# Patient Record
Sex: MALE | Race: WHITE | NOT HISPANIC OR LATINO | Employment: FULL TIME | URBAN - METROPOLITAN AREA
[De-identification: names, ages, dates, MRNs, and addresses within clinical notes are randomized per-mention and may not be internally consistent; named-entity substitution may affect disease eponyms.]

---

## 2019-03-06 ENCOUNTER — OFFICE VISIT (OUTPATIENT)
Dept: URGENT CARE | Facility: CLINIC | Age: 29
End: 2019-03-06
Payer: COMMERCIAL

## 2019-03-06 VITALS
BODY MASS INDEX: 23.03 KG/M2 | WEIGHT: 170 LBS | RESPIRATION RATE: 18 BRPM | DIASTOLIC BLOOD PRESSURE: 81 MMHG | TEMPERATURE: 100 F | HEIGHT: 72 IN | SYSTOLIC BLOOD PRESSURE: 135 MMHG | HEART RATE: 86 BPM | OXYGEN SATURATION: 100 %

## 2019-03-06 DIAGNOSIS — B30.9 ACUTE VIRAL CONJUNCTIVITIS OF RIGHT EYE: ICD-10-CM

## 2019-03-06 DIAGNOSIS — J02.0 STREP PHARYNGITIS: Primary | ICD-10-CM

## 2019-03-06 LAB — S PYO AG THROAT QL: POSITIVE

## 2019-03-06 PROCEDURE — 87430 STREP A AG IA: CPT | Performed by: EMERGENCY MEDICINE

## 2019-03-06 PROCEDURE — 99203 OFFICE O/P NEW LOW 30 MIN: CPT | Performed by: EMERGENCY MEDICINE

## 2019-03-06 RX ORDER — BUPRENORPHINE HYDROCHLORIDE, NALOXONE HYDROCHLORIDE 8; 2 MG/1; MG/1
FILM, SOLUBLE BUCCAL; SUBLINGUAL
Refills: 0 | COMMUNITY
Start: 2019-02-14

## 2019-03-06 RX ORDER — POLYMYXIN B SULFATE AND TRIMETHOPRIM 1; 10000 MG/ML; [USP'U]/ML
1 SOLUTION OPHTHALMIC EVERY 6 HOURS
Qty: 10 ML | Refills: 0 | Status: SHIPPED | OUTPATIENT
Start: 2019-03-06 | End: 2019-03-13

## 2019-03-06 RX ORDER — CLINDAMYCIN HYDROCHLORIDE 300 MG/1
300 CAPSULE ORAL 3 TIMES DAILY
Qty: 21 CAPSULE | Refills: 0 | Status: SHIPPED | OUTPATIENT
Start: 2019-03-06 | End: 2019-03-13

## 2019-03-06 RX ORDER — PREDNISONE 10 MG/1
TABLET ORAL
Qty: 27 TABLET | Refills: 0 | Status: SHIPPED | OUTPATIENT
Start: 2019-03-06

## 2019-03-06 NOTE — LETTER
March 6, 2019     Patient: Rosaura Barfield   YOB: 1990   Date of Visit: 3/6/2019       To Whom It May Concern: It is my medical opinion that Rosaura Barfield may return to work on 3/8/19  Please excuse 3/6 and 3/7  If you have any questions or concerns, please don't hesitate to call           Sincerely,        Evaristo Rosado MD    CC: No Recipients

## 2019-03-06 NOTE — PROGRESS NOTES
[facility]        NAME: Saji Garcia is a 26 y o  male  : 1990    MRN: [id number]  DATE: 2019  TIME: 10:29 AM    Assessment and Plan   Strep pharyngitis [J02 0]  1  Strep pharyngitis  POCT rapid strepA    clindamycin (CLEOCIN) 300 MG capsule    predniSONE 10 mg tablet   2  Acute viral conjunctivitis of right eye  polymyxin b-trimethoprim (POLYTRIM) ophthalmic solution         Patient Instructions     Patient Instructions     You have been diagnosed with conjunctivitis, which may be viral, bacterial, allergic or chemical and there is no test available in an urgent care setting can be used to tell the difference   Statistically 95% of the cases are viral   The viruses that cause conjunctivitis often times for the same viruses that cause viral upper respiratory infections  Although your conjunctivitis today is likely viral we do give antibiotic drops as a precaution   Avoid Visine or any similar vasoconstrictor drops as this will interfere with your body's ability to fight this infection   Use only the antibiotic drops as prescribed but you may also  use ice or cool compresses to t the he eyelids or an eye cup with cold saline to reduce redness or swelling  You may also take an anti-inflammatory like Advil leave for the redness and swelling  Take Zinc 12 5 to 15 mg every 2 - 3 hrs while awake for the next few days   You may take Cold Jose De Jesus (13 3 mg of Zinc) or split a 25 mg Zinc tablet or lozenge in two or a 50 mg into four to get the proper dose   The total daily dose of Zinc should exceed 75 mg per day  Conjunctivitis   AMBULATORY CARE:   Conjunctivitis,  or pink eye, is inflammation of your conjunctiva  The conjunctiva is a thin tissue that covers the front of your eye and the back of your eyelids  The conjunctiva helps protect your eye and keep it moist  Conjunctivitis may be caused by bacteria, allergies, or a virus   If your conjunctivitis is caused by bacteria, it may get better on its own in about 7 days  Viral conjunctivitis can last up to 3 weeks  Common symptoms may include any of the following: You will usually have symptoms in both eyes if your conjunctivitis is caused by allergies  You may also have other allergic symptoms, such as a rash or runny nose  Symptoms will usually start in 1 eye if your conjunctivitis is caused by a virus or bacteria  · Redness in the whites of your eye    · Itching in your eye or around your eye    · Feeling like there is something in your eye    · Watery or thick, sticky discharge    · Crusty eyelids when you wake up in the morning    · Burning, stinging, or swelling in your eye    · Pain when you see bright light  Seek care immediately if:   · You have worsening eye pain  · The swelling in your eye gets worse, even after treatment  · Your vision suddenly becomes worse or you cannot see at all  Contact your healthcare provider if:   · You develop a fever and ear pain  · You have tiny bumps or spots of blood on your eye  · You have questions or concerns about your condition or care  Treatment  will depend on the cause of your conjunctivitis  You may need antibiotics or allergy medicine as a pill, eye drop, or eye ointment  Manage your symptoms:   · Apply a cool compress  Wet a washcloth with cold water and place it on your eye  This will help decrease itching and irritation  · Do not wear contact lenses  They can irritate your eye  Throw away the pair you are using and ask when you can wear them again  Use a new pair of lenses when your healthcare provider says it is okay  · Avoid irritants  Stay away from smoke filled areas  Shield your eyes from wind and sun  · Flush your eye  You may need to flush your eye with saline to help decrease your symptoms  Ask for more information on how to flush your eye  Medicines:  Treatment depends on what is causing your conjunctivitis   You may be given any of the following:  · Allergy medicine  helps decrease itchy, red, swollen eyes caused by allergies  It may be given as a pill, eye drops, or nasal spray  · Antibiotics  may be needed if your conjunctivitis is caused by bacteria  This medicine may be given as a pill, eye drops, or eye ointment  · Take your medicine as directed  Contact your healthcare provider if you think your medicine is not helping or if you have side effects  Tell him or her if you are allergic to any medicine  Keep a list of the medicines, vitamins, and herbs you take  Include the amounts, and when and why you take them  Bring the list or the pill bottles to follow-up visits  Carry your medicine list with you in case of an emergency  Prevent the spread of conjunctivitis:   · Wash your hands with soap and water often  Wash your hands before and after you touch your eyes  Also wash your hands before you prepare or eat food and after you use the bathroom or change a diaper  · Avoid allergens  Try to avoid the things that cause your allergies, such as pets, dust, or grass  · Avoid contact with others  Do not share towels or washcloths  Try to stay away from others as much as possible  Ask when you can return to work or school  · Throw away eye makeup  The bacteria that caused your conjunctivitis can stay in eye makeup  Throw away mascara and other eye makeup  © 2017 [address] Information is for End User's use only and may not be sold, redistributed or otherwise used for commercial purposes  All illustrations and images included in CareNotes® are the copyrighted property of [employer]  or [employer]  The above information is an  only  It is not intended as medical advice for individual conditions or treatments  Talk to your doctor, nurse or pharmacist before following any medical regimen to see if it is safe and effective for you      Strep Throat, Ambulatory Care   GENERAL INFORMATION:   Strep throat  is a throat infection caused by bacteria  It is easily spread from person to person  Common symptoms include the following:   · Sore, red, and swollen throat    · Fever and headache     · Upset stomach, abdominal pain, or vomiting    · White or yellow patches or blisters in the back of your throat    · Tender, swollen lumps on the sides of your neck or jaw    · Throat pain when you swallow  Seek immediate care for the following symptoms:   · Throat pain that makes it too painful to drink fluids    · Drooling because you cannot swallow your spit    · Not able to open your mouth all the way or your voice is muffled    · Trouble breathing because your throat is swollen    · New symptoms like a bad headache, stiff neck, chest pain, or vomiting    · Blood in your urine and swollen face, feet, or hands  Treatment for strep throat:  You will need antibiotic medicine to treat your strep throat  Take your antibiotics until they are gone, even if you feel better  Do this unless your caregiver says it is okay to stop your antibiotics  You may return to work or school 24 hours after you start antibiotics  Manage strep throat:   · Do not smoke  If you smoke, it is never too late to quit  Smoking may make your symptoms worse  Ask for information if you need help quitting  · Drink juice, milk shakes, or soup  if your throat is too sore to eat solid food  Drinking liquids can also help prevent dehydration  · Gargle with salt water  Mix ¼ teaspoon salt in a glass of warm water and gargle  This may help reduce swelling in your throat  · Use lozenges, ice, soft foods, or popsicles  to soothe your throat    Prevent the spread of strep throat:   · Do not share food or drinks    · Wash your hands often    · Replace your toothbrush after you have taken antibiotics for 24 hours  Follow up with your healthcare provider as directed:  Write down your questions so you remember to ask them during your visits  CARE AGREEMENT:   You have the right to help plan your care  Learn about your health condition and how it may be treated  Discuss treatment options with your caregivers to decide what care you want to receive  You always have the right to refuse treatment  The above information is an  only  It is not intended as medical advice for individual conditions or treatments  Talk to your doctor, nurse or pharmacist before following any medical regimen to see if it is safe and effective for you  © 2014 [address] is for End User's use only and may not be sold, redistributed or otherwise used for commercial purposes  All illustrations and images included in CareNotes® are the copyrighted property of [employer]  or [employer]  Follow up with PCP in 3-5 days  Proceed to  ER if symptoms worsen  Chief Complaint     Chief Complaint   Patient presents with    Eye Pain     1 week with fever and right eye redness started last night with Rash          History of Present Illness       Patient complains of sore throat, fever, congestion, for the past several days now with pink right eye with discharge and a mildly pruritic truncal rash since last night  He has a history of 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  His most recent episodes of strep pharyngitis have been treated successfully with clindamycin 300 mg 3 times a day  Review of Systems   Review of Systems   Constitutional: Positive for fever  Negative for chills  HENT: Positive for congestion, rhinorrhea, sinus pressure and sore throat  Negative for ear discharge, ear pain and hearing loss  Eyes: Positive for discharge and redness  Negative for pain and visual disturbance  Respiratory: Negative for cough, shortness of breath and wheezing  Gastrointestinal: Negative for diarrhea and vomiting  Musculoskeletal: Negative for neck stiffness  Skin: Negative for pallor and rash  Neurological: Negative for headaches  Current Medications       Current Outpatient Medications:     clindamycin (CLEOCIN) 300 MG capsule, Take 1 capsule (300 mg total) by mouth 3 (three) times a day for 7 days, Disp: 21 capsule, Rfl: 0    polymyxin b-trimethoprim (POLYTRIM) ophthalmic solution, Administer 1 drop to the right eye every 6 (six) hours for 7 days, Disp: 10 mL, Rfl: 0    predniSONE 10 mg tablet, Take once daily all days pills on this schedule 6- 6- 5- 4- 3- 2- 1, Disp: 27 tablet, Rfl: 0    SUBOXONE 8-2 MG, DISSOLVE 2 FILMS IN YOUR MOUTH DAILY, Disp: , Rfl: 0    Current Allergies     Allergies as of 03/06/2019 - Reviewed 03/06/2019   Allergen Reaction Noted    Penicillins Anaphylaxis 03/06/2019            The following portions of the patient's history were reviewed and updated as appropriate: allergies, current medications, past family history, past medical history, past social history, past surgical history and problem list      Past Medical History:   Diagnosis Date    No known problems        Past Surgical History:   Procedure Laterality Date    HERNIA REPAIR         Family History   Problem Relation Age of Onset    Heart attack Mother     Stroke Mother     Diabetes Father          Medications have been verified  Objective   /81   Pulse 86   Temp 100 °F (37 8 °C)   Resp 18   Ht 6' (1 829 m)   Wt 77 1 kg (170 lb)   SpO2 100%   BMI 23 06 kg/m²        Physical Exam     Physical Exam   Constitutional: He is oriented to person, place, and time  He appears well-developed and well-nourished  No distress  HENT:   Mouth/Throat: Oropharyngeal exudate and posterior oropharyngeal erythema present  No tonsillar abscesses  Eyes: Pupils are equal, round, and reactive to light  EOM are normal    Conjunctiva injected on the right with watery discharge  Neck: Neck supple  Cardiovascular: Normal rate and regular rhythm     Pulmonary/Chest: Effort normal and breath sounds normal    Musculoskeletal: He exhibits no tenderness  Neurological: He is alert and oriented to person, place, and time  Skin: Skin is warm and dry  Rash noted  Erythematous, sandpaper type rash of his torso and proximal extremities  Nursing note and vitals reviewed

## 2019-03-06 NOTE — PATIENT INSTRUCTIONS
You have been diagnosed with conjunctivitis, which may be viral, bacterial, allergic or chemical and there is no test available in an urgent care setting can be used to tell the difference   Statistically 95% of the cases are viral   The viruses that cause conjunctivitis often times for the same viruses that cause viral upper respiratory infections  Although your conjunctivitis today is likely viral we do give antibiotic drops as a precaution   Avoid Visine or any similar vasoconstrictor drops as this will interfere with your body's ability to fight this infection   Use only the antibiotic drops as prescribed but you may also  use ice or cool compresses to t the he eyelids or an eye cup with cold saline to reduce redness or swelling  You may also take an anti-inflammatory like Advil leave for the redness and swelling  Take Zinc 12 5 to 15 mg every 2 - 3 hrs while awake for the next few days   You may take Cold Jose De Jesus (13 3 mg of Zinc) or split a 25 mg Zinc tablet or lozenge in two or a 50 mg into four to get the proper dose   The total daily dose of Zinc should exceed 75 mg per day  Conjunctivitis   AMBULATORY CARE:   Conjunctivitis,  or pink eye, is inflammation of your conjunctiva  The conjunctiva is a thin tissue that covers the front of your eye and the back of your eyelids  The conjunctiva helps protect your eye and keep it moist  Conjunctivitis may be caused by bacteria, allergies, or a virus  If your conjunctivitis is caused by bacteria, it may get better on its own in about 7 days  Viral conjunctivitis can last up to 3 weeks  Common symptoms may include any of the following: You will usually have symptoms in both eyes if your conjunctivitis is caused by allergies  You may also have other allergic symptoms, such as a rash or runny nose  Symptoms will usually start in 1 eye if your conjunctivitis is caused by a virus or bacteria    · Redness in the whites of your eye    · Itching in your eye or around your eye    · Feeling like there is something in your eye    · Watery or thick, sticky discharge    · Crusty eyelids when you wake up in the morning    · Burning, stinging, or swelling in your eye    · Pain when you see bright light  Seek care immediately if:   · You have worsening eye pain  · The swelling in your eye gets worse, even after treatment  · Your vision suddenly becomes worse or you cannot see at all  Contact your healthcare provider if:   · You develop a fever and ear pain  · You have tiny bumps or spots of blood on your eye  · You have questions or concerns about your condition or care  Treatment  will depend on the cause of your conjunctivitis  You may need antibiotics or allergy medicine as a pill, eye drop, or eye ointment  Manage your symptoms:   · Apply a cool compress  Wet a washcloth with cold water and place it on your eye  This will help decrease itching and irritation  · Do not wear contact lenses  They can irritate your eye  Throw away the pair you are using and ask when you can wear them again  Use a new pair of lenses when your healthcare provider says it is okay  · Avoid irritants  Stay away from smoke filled areas  Shield your eyes from wind and sun  · Flush your eye  You may need to flush your eye with saline to help decrease your symptoms  Ask for more information on how to flush your eye  Medicines:  Treatment depends on what is causing your conjunctivitis  You may be given any of the following:  · Allergy medicine  helps decrease itchy, red, swollen eyes caused by allergies  It may be given as a pill, eye drops, or nasal spray  · Antibiotics  may be needed if your conjunctivitis is caused by bacteria  This medicine may be given as a pill, eye drops, or eye ointment  · Take your medicine as directed  Contact your healthcare provider if you think your medicine is not helping or if you have side effects   Tell him or her if you are allergic to any medicine  Keep a list of the medicines, vitamins, and herbs you take  Include the amounts, and when and why you take them  Bring the list or the pill bottles to follow-up visits  Carry your medicine list with you in case of an emergency  Prevent the spread of conjunctivitis:   · Wash your hands with soap and water often  Wash your hands before and after you touch your eyes  Also wash your hands before you prepare or eat food and after you use the bathroom or change a diaper  · Avoid allergens  Try to avoid the things that cause your allergies, such as pets, dust, or grass  · Avoid contact with others  Do not share towels or washcloths  Try to stay away from others as much as possible  Ask when you can return to work or school  · Throw away eye makeup  The bacteria that caused your conjunctivitis can stay in eye makeup  Throw away mascara and other eye makeup  © 2017 [address] Information is for End User's use only and may not be sold, redistributed or otherwise used for commercial purposes  All illustrations and images included in CareNotes® are the copyrighted property of [employer]  or [employer]  The above information is an  only  It is not intended as medical advice for individual conditions or treatments  Talk to your doctor, nurse or pharmacist before following any medical regimen to see if it is safe and effective for you  Strep Throat, Ambulatory Care   GENERAL INFORMATION:   Strep throat  is a throat infection caused by bacteria  It is easily spread from person to person     Common symptoms include the following:   · Sore, red, and swollen throat    · Fever and headache     · Upset stomach, abdominal pain, or vomiting    · White or yellow patches or blisters in the back of your throat    · Tender, swollen lumps on the sides of your neck or jaw    · Throat pain when you swallow  Seek immediate care for the following symptoms: · Throat pain that makes it too painful to drink fluids    · Drooling because you cannot swallow your spit    · Not able to open your mouth all the way or your voice is muffled    · Trouble breathing because your throat is swollen    · New symptoms like a bad headache, stiff neck, chest pain, or vomiting    · Blood in your urine and swollen face, feet, or hands  Treatment for strep throat:  You will need antibiotic medicine to treat your strep throat  Take your antibiotics until they are gone, even if you feel better  Do this unless your caregiver says it is okay to stop your antibiotics  You may return to work or school 24 hours after you start antibiotics  Manage strep throat:   · Do not smoke  If you smoke, it is never too late to quit  Smoking may make your symptoms worse  Ask for information if you need help quitting  · Drink juice, milk shakes, or soup  if your throat is too sore to eat solid food  Drinking liquids can also help prevent dehydration  · Gargle with salt water  Mix ¼ teaspoon salt in a glass of warm water and gargle  This may help reduce swelling in your throat  · Use lozenges, ice, soft foods, or popsicles  to soothe your throat  Prevent the spread of strep throat:   · Do not share food or drinks    · Wash your hands often    · Replace your toothbrush after you have taken antibiotics for 24 hours  Follow up with your healthcare provider as directed:  Write down your questions so you remember to ask them during your visits  CARE AGREEMENT:   You have the right to help plan your care  Learn about your health condition and how it may be treated  Discuss treatment options with your caregivers to decide what care you want to receive  You always have the right to refuse treatment  The above information is an  only  It is not intended as medical advice for individual conditions or treatments   Talk to your doctor, nurse or pharmacist before following any medical regimen to see if it is safe and effective for you  © 2014 [address] is for End User's use only and may not be sold, redistributed or otherwise used for commercial purposes  All illustrations and images included in CareNotes® are the copyrighted property of [employer]  or [employer]

## 2023-10-15 PROBLEM — Z30.2 ENCOUNTER FOR VASECTOMY: Status: ACTIVE | Noted: 2023-10-15

## 2023-10-16 ENCOUNTER — OFFICE VISIT (OUTPATIENT)
Dept: UROLOGY | Facility: CLINIC | Age: 33
End: 2023-10-16
Payer: COMMERCIAL

## 2023-10-16 ENCOUNTER — APPOINTMENT (OUTPATIENT)
Dept: LAB | Facility: HOSPITAL | Age: 33
End: 2023-10-16
Payer: COMMERCIAL

## 2023-10-16 ENCOUNTER — PREP FOR PROCEDURE (OUTPATIENT)
Dept: UROLOGY | Facility: CLINIC | Age: 33
End: 2023-10-16

## 2023-10-16 VITALS
HEIGHT: 72 IN | TEMPERATURE: 98.6 F | HEART RATE: 67 BPM | BODY MASS INDEX: 24.57 KG/M2 | WEIGHT: 181.4 LBS | SYSTOLIC BLOOD PRESSURE: 122 MMHG | OXYGEN SATURATION: 98 % | DIASTOLIC BLOOD PRESSURE: 72 MMHG

## 2023-10-16 DIAGNOSIS — Z30.09 ENCOUNTER FOR OTHER GENERAL COUNSELING AND ADVICE ON CONTRACEPTION: ICD-10-CM

## 2023-10-16 DIAGNOSIS — Z01.818 ENCOUNTER FOR PREADMISSION TESTING: ICD-10-CM

## 2023-10-16 DIAGNOSIS — Z01.818 ENCOUNTER FOR PREADMISSION TESTING: Primary | ICD-10-CM

## 2023-10-16 DIAGNOSIS — Z30.2 ENCOUNTER FOR VASECTOMY: Primary | ICD-10-CM

## 2023-10-16 LAB
ANION GAP SERPL CALCULATED.3IONS-SCNC: 5 MMOL/L
BASOPHILS # BLD AUTO: 0.03 THOUSANDS/ÂΜL (ref 0–0.1)
BASOPHILS NFR BLD AUTO: 0 % (ref 0–1)
BUN SERPL-MCNC: 12 MG/DL (ref 5–25)
CALCIUM SERPL-MCNC: 9.6 MG/DL (ref 8.4–10.2)
CHLORIDE SERPL-SCNC: 103 MMOL/L (ref 96–108)
CO2 SERPL-SCNC: 30 MMOL/L (ref 21–32)
CREAT SERPL-MCNC: 0.84 MG/DL (ref 0.6–1.3)
EOSINOPHIL # BLD AUTO: 0.27 THOUSAND/ÂΜL (ref 0–0.61)
EOSINOPHIL NFR BLD AUTO: 4 % (ref 0–6)
ERYTHROCYTE [DISTWIDTH] IN BLOOD BY AUTOMATED COUNT: 11.9 % (ref 11.6–15.1)
GFR SERPL CREATININE-BSD FRML MDRD: 114 ML/MIN/1.73SQ M
GLUCOSE P FAST SERPL-MCNC: 95 MG/DL (ref 65–99)
HCT VFR BLD AUTO: 48.5 % (ref 36.5–49.3)
HGB BLD-MCNC: 16.3 G/DL (ref 12–17)
IMM GRANULOCYTES # BLD AUTO: 0.01 THOUSAND/UL (ref 0–0.2)
IMM GRANULOCYTES NFR BLD AUTO: 0 % (ref 0–2)
LYMPHOCYTES # BLD AUTO: 2.42 THOUSANDS/ÂΜL (ref 0.6–4.47)
LYMPHOCYTES NFR BLD AUTO: 32 % (ref 14–44)
MCH RBC QN AUTO: 29.1 PG (ref 26.8–34.3)
MCHC RBC AUTO-ENTMCNC: 33.6 G/DL (ref 31.4–37.4)
MCV RBC AUTO: 87 FL (ref 82–98)
MONOCYTES # BLD AUTO: 0.45 THOUSAND/ÂΜL (ref 0.17–1.22)
MONOCYTES NFR BLD AUTO: 6 % (ref 4–12)
NEUTROPHILS # BLD AUTO: 4.32 THOUSANDS/ÂΜL (ref 1.85–7.62)
NEUTS SEG NFR BLD AUTO: 58 % (ref 43–75)
NRBC BLD AUTO-RTO: 0 /100 WBCS
PLATELET # BLD AUTO: 197 THOUSANDS/UL (ref 149–390)
PMV BLD AUTO: 9.3 FL (ref 8.9–12.7)
POTASSIUM SERPL-SCNC: 4.4 MMOL/L (ref 3.5–5.3)
RBC # BLD AUTO: 5.6 MILLION/UL (ref 3.88–5.62)
SODIUM SERPL-SCNC: 138 MMOL/L (ref 135–147)
WBC # BLD AUTO: 7.5 THOUSAND/UL (ref 4.31–10.16)

## 2023-10-16 PROCEDURE — 36415 COLL VENOUS BLD VENIPUNCTURE: CPT

## 2023-10-16 PROCEDURE — 80048 BASIC METABOLIC PNL TOTAL CA: CPT

## 2023-10-16 PROCEDURE — 99203 OFFICE O/P NEW LOW 30 MIN: CPT | Performed by: UROLOGY

## 2023-10-16 PROCEDURE — 85025 COMPLETE CBC W/AUTO DIFF WBC: CPT

## 2023-10-16 RX ORDER — LEVOFLOXACIN 5 MG/ML
500 INJECTION, SOLUTION INTRAVENOUS ONCE
OUTPATIENT
Start: 2023-10-16 | End: 2023-10-16

## 2023-10-16 NOTE — PROGRESS NOTES
UROLOGY PROGRESS NOTE         NAME: Livia Thomson  AGE: 28 y.o. SEX: male  : 1990   MRN: [id number]    DATE: 10/16/2023  TIME: 10:15 AM    Assessment and Plan      Impression:   1. Encounter for vasectomy  -     Case request operating room: VASECTOMY; Standing  -     Case request operating room: VASECTOMY    2. Encounter for other general counseling and advice on contraception  -     Ambulatory Referral to Urology         Plan: Patient wants to proceed with vasectomy which will be done under an appropriate anesthetic at the hospital.  This is primarily due to the fact that he was sensitive and vasovagal with the exam as well as scarring on the left side related to his previous hernia repair. The vasectomy procedure is risks limitations outcomes and alternatives were reviewed in detail and he wishes to proceed. Pertinent risks including bleeding infection recanalization, failure to sterilize the patient, need for additional procedures sperm granuloma, chronic pain amongst others were reviewed and understood. Chief Complaint     Chief Complaint   Patient presents with   • Vasectomy     History of Present Illness     HPI: Livia Thomson is a 28y.o. year old male who presents with desire for elective sterilization by vasectomy. Patient is  his wife is 28 they have 2 children who are teenagers. He is otherwise in good health. The following portions of the patient's history were reviewed and updated as appropriate: allergies, current medications, past family history, past medical history, past social history, past surgical history and problem list.  Past Medical History:   Diagnosis Date   • No known problems      Past Surgical History:   Procedure Laterality Date   • HERNIA REPAIR       shoulder  Review of Systems     Const: Denies chills, fever and weight loss. CV: Denies chest pain. Resp: Denies SOB. GI: Denies abdominal pain, nausea and vomiting.   : Denies symptoms other than stated above. Musculo: Denies back pain. Objective   /72 (BP Location: Left arm, Patient Position: Sitting, Cuff Size: Adult)   Pulse 67   Temp 98.6 °F (37 °C)   Ht 6' (1.829 m)   Wt 82.3 kg (181 lb 6.4 oz)   SpO2 98%   BMI 24.60 kg/m²     Physical Exam  Const: Appears healthy and well developed. No signs of acute distress present. Resp: Respirations are regular and unlabored. CV: Rate is regular. Rhythm is regular. Abdomen: Abdomen is soft, nontender, and nondistended. Kidneys are not palpable. : Patient had a hernia repair on the left side many years ago. Apparently this was an incarcerated hernia. The left vas deferens is present but somewhat difficult to access. There are some scarring likely related to his old hernia repair. The right vas is accessible and normal.  He is also very sensitive with the exam and became vasovagal.  The testes do not show any evidence of mass each epididymis is normal.  Psych: Patient's attitude is cooperative.  Mood is normal. Affect is normal.    Procedure   Procedures     Current Medications     Current Outpatient Medications:   •  SUBOXONE 8-2 MG, DISSOLVE 2 FILMS IN YOUR MOUTH DAILY, Disp: , Rfl: 0  •  predniSONE 10 mg tablet, Take once daily all days pills on this schedule 6- 6- 5- 4- 3- 2- 1 (Patient not taking: Reported on 10/16/2023), Disp: 27 tablet, Rfl: 0        Mauricio Live MD

## 2023-10-16 NOTE — H&P (VIEW-ONLY)
UROLOGY PROGRESS NOTE         NAME: Oc Ramirez  AGE: 28 y.o. SEX: male  : 1990   MRN: [id number]    DATE: 10/16/2023  TIME: 10:15 AM    Assessment and Plan      Impression:   1. Encounter for vasectomy  -     Case request operating room: VASECTOMY; Standing  -     Case request operating room: VASECTOMY    2. Encounter for other general counseling and advice on contraception  -     Ambulatory Referral to Urology         Plan: Patient wants to proceed with vasectomy which will be done under an appropriate anesthetic at the hospital.  This is primarily due to the fact that he was sensitive and vasovagal with the exam as well as scarring on the left side related to his previous hernia repair. The vasectomy procedure is risks limitations outcomes and alternatives were reviewed in detail and he wishes to proceed. Pertinent risks including bleeding infection recanalization, failure to sterilize the patient, need for additional procedures sperm granuloma, chronic pain amongst others were reviewed and understood. Chief Complaint     Chief Complaint   Patient presents with   • Vasectomy     History of Present Illness     HPI: Oc Ramirez is a 28y.o. year old male who presents with desire for elective sterilization by vasectomy. Patient is  his wife is 28 they have 2 children who are teenagers. He is otherwise in good health. The following portions of the patient's history were reviewed and updated as appropriate: allergies, current medications, past family history, past medical history, past social history, past surgical history and problem list.  Past Medical History:   Diagnosis Date   • No known problems      Past Surgical History:   Procedure Laterality Date   • HERNIA REPAIR       shoulder  Review of Systems     Const: Denies chills, fever and weight loss. CV: Denies chest pain. Resp: Denies SOB. GI: Denies abdominal pain, nausea and vomiting.   : Denies symptoms other than stated above. Musculo: Denies back pain. Objective   /72 (BP Location: Left arm, Patient Position: Sitting, Cuff Size: Adult)   Pulse 67   Temp 98.6 °F (37 °C)   Ht 6' (1.829 m)   Wt 82.3 kg (181 lb 6.4 oz)   SpO2 98%   BMI 24.60 kg/m²     Physical Exam  Const: Appears healthy and well developed. No signs of acute distress present. Resp: Respirations are regular and unlabored. CV: Rate is regular. Rhythm is regular. Abdomen: Abdomen is soft, nontender, and nondistended. Kidneys are not palpable. : Patient had a hernia repair on the left side many years ago. Apparently this was an incarcerated hernia. The left vas deferens is present but somewhat difficult to access. There are some scarring likely related to his old hernia repair. The right vas is accessible and normal.  He is also very sensitive with the exam and became vasovagal.  The testes do not show any evidence of mass each epididymis is normal.  Psych: Patient's attitude is cooperative.  Mood is normal. Affect is normal.    Procedure   Procedures     Current Medications     Current Outpatient Medications:   •  SUBOXONE 8-2 MG, DISSOLVE 2 FILMS IN YOUR MOUTH DAILY, Disp: , Rfl: 0  •  predniSONE 10 mg tablet, Take once daily all days pills on this schedule 6- 6- 5- 4- 3- 2- 1 (Patient not taking: Reported on 10/16/2023), Disp: 27 tablet, Rfl: 0        Mauricio Live MD

## 2023-10-16 NOTE — H&P
UROLOGY PROGRESS NOTE         NAME: Livia Thomson  AGE: 28 y.o. SEX: male  : 1990   MRN: [id number]    DATE: 10/16/2023  TIME: 10:15 AM    Assessment and Plan      Impression:   1. Encounter for vasectomy  -     Case request operating room: VASECTOMY; Standing  -     Case request operating room: VASECTOMY    2. Encounter for other general counseling and advice on contraception  -     Ambulatory Referral to Urology         Plan: Patient wants to proceed with vasectomy which will be done under an appropriate anesthetic at the hospital.  This is primarily due to the fact that he was sensitive and vasovagal with the exam as well as scarring on the left side related to his previous hernia repair. The vasectomy procedure is risks limitations outcomes and alternatives were reviewed in detail and he wishes to proceed. Pertinent risks including bleeding infection recanalization, failure to sterilize the patient, need for additional procedures sperm granuloma, chronic pain amongst others were reviewed and understood. Chief Complaint     Chief Complaint   Patient presents with   • Vasectomy     History of Present Illness     HPI: Livia Thomson is a 28y.o. year old male who presents with desire for elective sterilization by vasectomy. Patient is  his wife is 28 they have 2 children who are teenagers. He is otherwise in good health. The following portions of the patient's history were reviewed and updated as appropriate: allergies, current medications, past family history, past medical history, past social history, past surgical history and problem list.  Past Medical History:   Diagnosis Date   • No known problems      Past Surgical History:   Procedure Laterality Date   • HERNIA REPAIR       shoulder  Review of Systems     Const: Denies chills, fever and weight loss. CV: Denies chest pain. Resp: Denies SOB. GI: Denies abdominal pain, nausea and vomiting.   : Denies symptoms other than stated above. Musculo: Denies back pain. Objective   /72 (BP Location: Left arm, Patient Position: Sitting, Cuff Size: Adult)   Pulse 67   Temp 98.6 °F (37 °C)   Ht 6' (1.829 m)   Wt 82.3 kg (181 lb 6.4 oz)   SpO2 98%   BMI 24.60 kg/m²     Physical Exam  Const: Appears healthy and well developed. No signs of acute distress present. Resp: Respirations are regular and unlabored. CV: Rate is regular. Rhythm is regular. Abdomen: Abdomen is soft, nontender, and nondistended. Kidneys are not palpable. : Patient had a hernia repair on the left side many years ago. Apparently this was an incarcerated hernia. The left vas deferens is present but somewhat difficult to access. There are some scarring likely related to his old hernia repair. The right vas is accessible and normal.  He is also very sensitive with the exam and became vasovagal.  The testes do not show any evidence of mass each epididymis is normal.  Psych: Patient's attitude is cooperative.  Mood is normal. Affect is normal.    Procedure   Procedures     Current Medications     Current Outpatient Medications:   •  SUBOXONE 8-2 MG, DISSOLVE 2 FILMS IN YOUR MOUTH DAILY, Disp: , Rfl: 0  •  predniSONE 10 mg tablet, Take once daily all days pills on this schedule 6- 6- 5- 4- 3- 2- 1 (Patient not taking: Reported on 10/16/2023), Disp: 27 tablet, Rfl: 0        Reno Bennett MD

## 2023-10-17 ENCOUNTER — ANESTHESIA EVENT (OUTPATIENT)
Dept: PERIOP | Facility: HOSPITAL | Age: 33
End: 2023-10-17
Payer: COMMERCIAL

## 2023-10-20 RX ORDER — MULTIVITAMIN
1 TABLET ORAL DAILY
COMMUNITY

## 2023-10-20 NOTE — PRE-PROCEDURE INSTRUCTIONS
Pre-Surgery Instructions:   Medication Instructions    Multiple Vitamin (multivitamin) tablet Stop taking 7 days prior to surgery. SUBOXONE 8-2 MG Take day of surgery. Medication instructions for day surgery reviewed. Please use only a sip of water to take your instructed medications. Avoid all over the counter vitamins, supplements and NSAIDS for one week prior to surgery per anesthesia guidelines. Tylenol is ok to take as needed. You will receive a call one business day prior to surgery with an arrival time and hospital directions. If your surgery is scheduled on a Monday, the hospital will be calling you on the Friday prior to your surgery. If you have not heard from anyone by 8pm, please call the hospital supervisor through the hospital  at [phone]. Phyllis Every [phone]). Do not eat or drink anything after midnight the night before your surgery, including candy, mints, lifesavers, or chewing gum. Do not drink alcohol 24hrs before your surgery. Try not to smoke at least 24hrs before your surgery. Follow the pre surgery showering instructions as listed in the [facility] Surgical Experience Booklet” or otherwise provided by your surgeon's office. Do not shave the surgical area 24 hours before surgery. Do not apply any lotions, creams, including makeup, cologne, deodorant, or perfumes after showering on the day of your surgery. No contact lenses, eye make-up, or artificial eyelashes. Remove nail polish, including gel polish, and any artificial, gel, or acrylic nails if possible. Remove all jewelry including rings and body piercing jewelry. Wear causal clothing that is easy to take on and off. Consider your type of surgery. Keep any valuables, jewelry, piercings at home. Please bring any specially ordered equipment (sling, braces) if indicated. Arrange for a responsible person to drive you to and from the hospital on the day of your surgery. Visitor Guidelines discussed. Call the surgeon's office with any new illnesses, exposures, or additional questions prior to surgery. Please reference your [facility] Surgical Experience Booklet” for additional information to prepare for your upcoming surgery.

## 2023-10-30 ENCOUNTER — ANESTHESIA (OUTPATIENT)
Dept: PERIOP | Facility: HOSPITAL | Age: 33
End: 2023-10-30
Payer: COMMERCIAL

## 2023-10-30 ENCOUNTER — HOSPITAL ENCOUNTER (OUTPATIENT)
Facility: HOSPITAL | Age: 33
Setting detail: OUTPATIENT SURGERY
Discharge: HOME | End: 2023-10-30
Attending: UROLOGY | Admitting: UROLOGY
Payer: COMMERCIAL

## 2023-10-30 VITALS
TEMPERATURE: 98 F | SYSTOLIC BLOOD PRESSURE: 111 MMHG | WEIGHT: 181 LBS | BODY MASS INDEX: 24.52 KG/M2 | OXYGEN SATURATION: 99 % | DIASTOLIC BLOOD PRESSURE: 67 MMHG | HEIGHT: 72 IN | HEART RATE: 64 BPM | RESPIRATION RATE: 13 BRPM

## 2023-10-30 DIAGNOSIS — Z30.2 ENCOUNTER FOR VASECTOMY: ICD-10-CM

## 2023-10-30 PROCEDURE — 88302 TISSUE EXAM BY PATHOLOGIST: CPT | Performed by: SPECIALIST

## 2023-10-30 PROCEDURE — 55250 REMOVAL OF SPERM DUCT(S): CPT | Performed by: UROLOGY

## 2023-10-30 RX ORDER — PROPOFOL 10 MG/ML
INJECTION, EMULSION INTRAVENOUS AS NEEDED
Status: DISCONTINUED | OUTPATIENT
Start: 2023-10-30 | End: 2023-10-30

## 2023-10-30 RX ORDER — MAGNESIUM HYDROXIDE 1200 MG/15ML
LIQUID ORAL AS NEEDED
Status: DISCONTINUED | OUTPATIENT
Start: 2023-10-30 | End: 2023-10-30 | Stop reason: HOSPADM

## 2023-10-30 RX ORDER — ONDANSETRON 2 MG/ML
4 INJECTION INTRAMUSCULAR; INTRAVENOUS ONCE AS NEEDED
Status: DISCONTINUED | OUTPATIENT
Start: 2023-10-30 | End: 2023-10-30 | Stop reason: HOSPADM

## 2023-10-30 RX ORDER — LIDOCAINE HYDROCHLORIDE AND EPINEPHRINE 10; 10 MG/ML; UG/ML
INJECTION, SOLUTION INFILTRATION; PERINEURAL AS NEEDED
Status: DISCONTINUED | OUTPATIENT
Start: 2023-10-30 | End: 2023-10-30 | Stop reason: HOSPADM

## 2023-10-30 RX ORDER — MIDAZOLAM HYDROCHLORIDE 2 MG/2ML
INJECTION, SOLUTION INTRAMUSCULAR; INTRAVENOUS AS NEEDED
Status: DISCONTINUED | OUTPATIENT
Start: 2023-10-30 | End: 2023-10-30

## 2023-10-30 RX ORDER — CIPROFLOXACIN 500 MG/1
500 TABLET, FILM COATED ORAL EVERY 24 HOURS
Qty: 3 TABLET | Refills: 0 | Status: SHIPPED | OUTPATIENT
Start: 2023-10-30 | End: 2023-11-02

## 2023-10-30 RX ORDER — SODIUM CHLORIDE, SODIUM LACTATE, POTASSIUM CHLORIDE, CALCIUM CHLORIDE 600; 310; 30; 20 MG/100ML; MG/100ML; MG/100ML; MG/100ML
125 INJECTION, SOLUTION INTRAVENOUS CONTINUOUS
Status: DISCONTINUED | OUTPATIENT
Start: 2023-10-30 | End: 2023-10-30 | Stop reason: HOSPADM

## 2023-10-30 RX ORDER — LEVOFLOXACIN 5 MG/ML
500 INJECTION, SOLUTION INTRAVENOUS ONCE
Status: COMPLETED | OUTPATIENT
Start: 2023-10-30 | End: 2023-10-30

## 2023-10-30 RX ORDER — METOCLOPRAMIDE HYDROCHLORIDE 5 MG/ML
10 INJECTION INTRAMUSCULAR; INTRAVENOUS ONCE AS NEEDED
Status: DISCONTINUED | OUTPATIENT
Start: 2023-10-30 | End: 2023-10-30 | Stop reason: HOSPADM

## 2023-10-30 RX ORDER — LIDOCAINE HYDROCHLORIDE 10 MG/ML
0.5 INJECTION, SOLUTION EPIDURAL; INFILTRATION; INTRACAUDAL; PERINEURAL ONCE AS NEEDED
Status: DISCONTINUED | OUTPATIENT
Start: 2023-10-30 | End: 2023-10-30 | Stop reason: HOSPADM

## 2023-10-30 RX ORDER — GINSENG 100 MG
CAPSULE ORAL AS NEEDED
Status: DISCONTINUED | OUTPATIENT
Start: 2023-10-30 | End: 2023-10-30 | Stop reason: HOSPADM

## 2023-10-30 RX ORDER — DIPHENHYDRAMINE HYDROCHLORIDE 50 MG/ML
12.5 INJECTION INTRAMUSCULAR; INTRAVENOUS ONCE AS NEEDED
Status: DISCONTINUED | OUTPATIENT
Start: 2023-10-30 | End: 2023-10-30 | Stop reason: HOSPADM

## 2023-10-30 RX ADMIN — PROPOFOL 40 MG: 10 INJECTION, EMULSION INTRAVENOUS at 14:43

## 2023-10-30 RX ADMIN — MIDAZOLAM HYDROCHLORIDE 2 MG: 2 INJECTION, SOLUTION INTRAMUSCULAR; INTRAVENOUS at 14:34

## 2023-10-30 RX ADMIN — PROPOFOL 20 MG: 10 INJECTION, EMULSION INTRAVENOUS at 14:44

## 2023-10-30 RX ADMIN — LEVOFLOXACIN: 500 INJECTION, SOLUTION INTRAVENOUS at 14:38

## 2023-10-30 RX ADMIN — SODIUM CHLORIDE, SODIUM LACTATE, POTASSIUM CHLORIDE, AND CALCIUM CHLORIDE: .6; .31; .03; .02 INJECTION, SOLUTION INTRAVENOUS at 13:00

## 2023-10-30 RX ADMIN — PROPOFOL 40 MG: 10 INJECTION, EMULSION INTRAVENOUS at 14:42

## 2023-10-30 RX ADMIN — PROPOFOL 30 MG: 10 INJECTION, EMULSION INTRAVENOUS at 14:46

## 2023-10-30 NOTE — OP NOTE
OPERATIVE REPORT  PATIENT NAME: Yfn Riley    :  1990  MRN: [id number]  Pt Location: [facility] OR ROOM 02    SURGERY DATE: 10/30/2023    Surgeon(s) and Role:     * Parvin Morrison MD - Primary    Preop Diagnosis:  Encounter for vasectomy [Z30.2]    Post-Op Diagnosis Codes:     * Encounter for vasectomy [Z30.2]    Procedure(s):  Bilateral - VASECTOMY    Specimen(s):  ID Type Source Tests Collected by Time Destination   1 : Left Vas Deferens Tissue Vas Deferens, Left TISSUE EXAM Parvin Morrison MD 10/30/2023  2:53 PM    2 : Right Vas Deferens Tissue Vas Deferens, Right TISSUE EXAM Parvin Morrison MD 10/30/2023  2:59 PM        Estimated Blood Loss:   Minimal    Drains:  * No LDAs found *    Anesthesia Type:   General    Operative Indications:  Encounter for vasectomy [Z30.2]  As above    Operative Findings:  Normal vas deferens bilaterally    Complications:   None    Procedure and Technique:  Patient was brought to the operating room identified and a timeout was satisfactory. He was placed in a supine position. He underwent his anesthetic by the anesthesia department. This was IV sedation. The scrotum was shaved prepped and draped in usual sterile fashion using Hibiclens. The left vas deferens was brought up to position under the scrotal skin. The scrotal skin and perivasal tissue were then infiltrated with 1% Xylocaine with epinephrine. A small linear less than 1 cm incision was then made through the skin. The vas was then grasped using a ring clamp and withdrawn through the incision. The left vas deferens was freed from adjacent tissue. A less than 1 cm segment of vas was then excised and sent for pathologic scrutiny. Each cut end of the vas was then carefully fulgurated for at least 1 cm of its luminal length using needlepoint cautery. I then tied off the proximal and distal cut end of the vas using a 4-0 nylon suture.   The distal lower abdominal end of the vas was then buried under a separate plane of the perivasal tissue to establish a fascial barrier between the 2 ends. This was done using 4-0 chromic. Hemostasis was excellent the area was irrigated. The left operative site was then allowed to drop back into the left hemiscrotum. The area was observed there was no bleeding. I then closed the left scrotal incision using a 4-0 chromic suture. In a similar fashion to the left side a right vasectomy was carried out. The specimen was also sent for pathologic scrutiny. At the completion of the procedure, bacitracin ointment was placed over each incision site. Fluffs and an athletic supporter were then placed on the patient. He tolerated the procedure well. His anesthetic was reversed and he was returned to the recovery room in satisfactory condition. Findings were reviewed after the procedure and I reiterated to him that he is not sterile at this point and we need to negative semen analyses at 8 and 12 weeks to confirm sterility before he stops contraception. He understands that he needs to continue contraception until we have verification of sterility. I also spoke with his wife regarding this. We did not call in a pain medication for him. He has had issues with narcotic addiction in the past.  He will be using extra strength Tylenol. He has a penicillin allergy. We called in a 3-day course of Cipro for him and he did receive Levaquin x1 dose preprocedure. Activity instructions and limitations were reviewed with he and his wife post procedure. I was present for the entire procedure.     Patient Disposition:  PACU         SIGNATURE: Brayden Love MD  DATE: October 30, 2023  TIME: 3:31 PM

## 2023-10-30 NOTE — ANESTHESIA PREPROCEDURE EVALUATION
Procedure:  VASECTOMY (Bilateral: Scrotum)    Relevant Problems   ANESTHESIA (within normal limits)      CARDIO (within normal limits)      ENDO (within normal limits)      GI/HEPATIC (within normal limits)      /RENAL (within normal limits)      HEMATOLOGY (within normal limits)      MUSCULOSKELETAL (within normal limits)      NEURO/PSYCH (within normal limits)      PULMONARY (within normal limits)        Physical Exam    Airway    Mallampati score: II  TM Distance: >3 FB  Neck ROM: full     Dental   No notable dental hx     Cardiovascular  Rhythm: regular, Rate: normal, Cardiovascular exam normal    Pulmonary  Pulmonary exam normal Breath sounds clear to auscultation    Other Findings        Anesthesia Plan  ASA Score- 1     Anesthesia Type- IV sedation with anesthesia with ASA Monitors. Additional Monitors:     Airway Plan:            Plan Factors-Exercise tolerance (METS): >4 METS. Chart reviewed. EKG reviewed. Imaging results reviewed. Existing labs reviewed. Patient summary reviewed. Induction- intravenous. Postoperative Plan-     Informed Consent- Anesthetic plan and risks discussed with patient. I personally reviewed this patient with the CRNA. Discussed and agreed on the Anesthesia Plan with the CRNA. [facility] Recent labs personally reviewed:  Lab Results   Component Value Date    WBC 7.50 10/16/2023    HGB 16.3 10/16/2023     10/16/2023     Lab Results   Component Value Date    K 4.4 10/16/2023    BUN 12 10/16/2023    CREATININE 0.84 10/16/2023         I, Shaka Christie MD, have personally seen and evaluated the patient prior to anesthetic care. I have reviewed the pre-anesthetic record, and other medical records if appropriate to the anesthetic care. If a CRNA is involved in the case, I have reviewed the CRNA assessment, if present, and agree.  Risks/benefits and alternatives discussed with patient including possible PONV, sore throat, and possibility of rare anesthetic and surgical emergencies.

## 2023-10-30 NOTE — ANESTHESIA POSTPROCEDURE EVALUATION
Post-Op Assessment Note    CV Status:  Stable  Pain Score: 0    Pain management: adequate     Mental Status:  Alert and awake   Hydration Status:  Euvolemic   PONV Controlled:  Controlled   Airway Patency:  Patent      Post Op Vitals Reviewed: Yes      Staff: CRNA, Anesthesiologist         No notable events documented.     BP   110/60   Temp      Pulse  62   Resp   12   SpO2   99

## 2023-11-03 PROCEDURE — 88302 TISSUE EXAM BY PATHOLOGIST: CPT | Performed by: SPECIALIST

## 2024-02-27 ENCOUNTER — APPOINTMENT (OUTPATIENT)
Dept: LAB | Facility: HOSPITAL | Age: 34
End: 2024-02-27
Payer: COMMERCIAL

## 2024-02-27 DIAGNOSIS — Z30.2 STERILIZATION: Primary | ICD-10-CM

## 2024-02-27 DIAGNOSIS — Z30.2 ENCOUNTER FOR VASECTOMY: ICD-10-CM

## 2024-02-27 LAB
DEPRECATED CD4 CELLS/CD8 CELLS BLD: 4.5 ML
SPERM MOTILE SMN QL MICRO: NORMAL

## 2024-02-27 PROCEDURE — 89321 SEMEN ANAL SPERM DETECTION: CPT

## 2024-03-11 ENCOUNTER — TELEPHONE (OUTPATIENT)
Dept: UROLOGY | Facility: CLINIC | Age: 34
End: 2024-03-11

## 2024-03-11 NOTE — LETTER
[facility] [address]  [address]  [facility] [address]  Phone#  [phone]  Fax#  [phone]      March 14, 2024      Dear:   Rasheed Rocha         Our office has attempted to contact you several times regarding your Results.  Could you please contact our office at [phone].    Thank you.     Sincerely,    Deidre Oscar LPN

## 2024-03-11 NOTE — RESULT ENCOUNTER NOTE
Notify patient that initial semen analysis is negative.  Continue contraception and repeat semen analysis in 4 weeks.

## 2024-03-11 NOTE — TELEPHONE ENCOUNTER
----- Message from Jaxon Thomason MD sent at 3/10/2024  8:55 PM EDT -----  Notify patient that initial semen analysis is negative.  Continue contraception and repeat semen analysis in 4 weeks.

## 2025-01-20 ENCOUNTER — HOSPITAL ENCOUNTER (EMERGENCY)
Facility: HOSPITAL | Age: 35
Discharge: HOME | End: 2025-01-20
Attending: EMERGENCY MEDICINE
Payer: COMMERCIAL

## 2025-01-20 ENCOUNTER — APPOINTMENT (EMERGENCY)
Dept: CT IMAGING | Facility: HOSPITAL | Age: 35
End: 2025-01-20
Payer: COMMERCIAL

## 2025-01-20 VITALS
BODY MASS INDEX: 25.08 KG/M2 | HEIGHT: 72 IN | HEART RATE: 68 BPM | SYSTOLIC BLOOD PRESSURE: 121 MMHG | DIASTOLIC BLOOD PRESSURE: 74 MMHG | RESPIRATION RATE: 16 BRPM | OXYGEN SATURATION: 98 % | WEIGHT: 185.19 LBS | TEMPERATURE: 98.8 F

## 2025-01-20 DIAGNOSIS — R07.9 CHEST PAIN, UNSPECIFIED TYPE: Primary | ICD-10-CM

## 2025-01-20 DIAGNOSIS — R00.2 PALPITATIONS: ICD-10-CM

## 2025-01-20 LAB
ALBUMIN SERPL BCG-MCNC: 4.7 G/DL (ref 3.5–5)
ALP SERPL-CCNC: 55 U/L (ref 34–104)
ALT SERPL W P-5'-P-CCNC: 23 U/L (ref 7–52)
ANION GAP SERPL CALCULATED.3IONS-SCNC: 5 MMOL/L (ref 4–13)
APTT PPP: 30 SECONDS (ref 23–34)
AST SERPL W P-5'-P-CCNC: 31 U/L (ref 13–39)
ATRIAL RATE: 75 BPM
BASOPHILS # BLD AUTO: 0.06 THOUSANDS/ΜL (ref 0–0.1)
BASOPHILS NFR BLD AUTO: 1 % (ref 0–1)
BILIRUB SERPL-MCNC: 0.6 MG/DL (ref 0.2–1)
BUN SERPL-MCNC: 13 MG/DL (ref 5–25)
CALCIUM SERPL-MCNC: 9.9 MG/DL (ref 8.4–10.2)
CARDIAC TROPONIN I PNL SERPL HS: 3 NG/L (ref ?–50)
CHLORIDE SERPL-SCNC: 99 MMOL/L (ref 96–108)
CO2 SERPL-SCNC: 33 MMOL/L (ref 21–32)
CREAT SERPL-MCNC: 0.93 MG/DL (ref 0.6–1.3)
EOSINOPHIL # BLD AUTO: 0.17 THOUSAND/ΜL (ref 0–0.61)
EOSINOPHIL NFR BLD AUTO: 2 % (ref 0–6)
ERYTHROCYTE [DISTWIDTH] IN BLOOD BY AUTOMATED COUNT: 12.4 % (ref 11.6–15.1)
GFR SERPL CREATININE-BSD FRML MDRD: 105 ML/MIN/1.73SQ M
GLUCOSE SERPL-MCNC: 123 MG/DL (ref 65–140)
GLUCOSE SERPL-MCNC: 130 MG/DL (ref 65–140)
HCT VFR BLD AUTO: 45.9 % (ref 36.5–49.3)
HGB BLD-MCNC: 16 G/DL (ref 12–17)
IMM GRANULOCYTES # BLD AUTO: 0.03 THOUSAND/UL (ref 0–0.2)
IMM GRANULOCYTES NFR BLD AUTO: 0 % (ref 0–2)
INR PPP: 0.91 (ref 0.85–1.19)
LYMPHOCYTES # BLD AUTO: 3.52 THOUSANDS/ΜL (ref 0.6–4.47)
LYMPHOCYTES NFR BLD AUTO: 31 % (ref 14–44)
MAGNESIUM SERPL-MCNC: 2.1 MG/DL (ref 1.9–2.7)
MCH RBC QN AUTO: 29.3 PG (ref 26.8–34.3)
MCHC RBC AUTO-ENTMCNC: 34.9 G/DL (ref 31.4–37.4)
MCV RBC AUTO: 84 FL (ref 82–98)
MONOCYTES # BLD AUTO: 0.57 THOUSAND/ΜL (ref 0.17–1.22)
MONOCYTES NFR BLD AUTO: 5 % (ref 4–12)
NEUTROPHILS # BLD AUTO: 6.99 THOUSANDS/ΜL (ref 1.85–7.62)
NEUTS SEG NFR BLD AUTO: 61 % (ref 43–75)
NRBC BLD AUTO-RTO: 0 /100 WBCS
P AXIS: 53 DEGREES
PLATELET # BLD AUTO: 227 THOUSANDS/UL (ref 149–390)
PMV BLD AUTO: 9.3 FL (ref 8.9–12.7)
POTASSIUM SERPL-SCNC: 3.5 MMOL/L (ref 3.5–5.3)
PR INTERVAL: 188 MS
PROT SERPL-MCNC: 7.7 G/DL (ref 6.4–8.4)
PROTHROMBIN TIME: 12.6 SECONDS (ref 12.3–15)
QRS AXIS: 55 DEGREES
QRSD INTERVAL: 78 MS
QT INTERVAL: 368 MS
QTC INTERVAL: 410 MS
RBC # BLD AUTO: 5.47 MILLION/UL (ref 3.88–5.62)
SODIUM SERPL-SCNC: 137 MMOL/L (ref 135–147)
T WAVE AXIS: 55 DEGREES
TSH SERPL DL<=0.05 MIU/L-ACNC: 3.36 UIU/ML (ref 0.45–4.5)
VENTRICULAR RATE: 75 BPM
WBC # BLD AUTO: 11.34 THOUSAND/UL (ref 4.31–10.16)

## 2025-01-20 PROCEDURE — 71275 CT ANGIOGRAPHY CHEST: CPT

## 2025-01-20 PROCEDURE — 36415 COLL VENOUS BLD VENIPUNCTURE: CPT | Performed by: EMERGENCY MEDICINE

## 2025-01-20 PROCEDURE — 84443 ASSAY THYROID STIM HORMONE: CPT | Performed by: EMERGENCY MEDICINE

## 2025-01-20 PROCEDURE — 84484 ASSAY OF TROPONIN QUANT: CPT | Performed by: EMERGENCY MEDICINE

## 2025-01-20 PROCEDURE — 96361 HYDRATE IV INFUSION ADD-ON: CPT

## 2025-01-20 PROCEDURE — 93005 ELECTROCARDIOGRAM TRACING: CPT

## 2025-01-20 PROCEDURE — 85610 PROTHROMBIN TIME: CPT | Performed by: EMERGENCY MEDICINE

## 2025-01-20 PROCEDURE — 80053 COMPREHEN METABOLIC PANEL: CPT | Performed by: EMERGENCY MEDICINE

## 2025-01-20 PROCEDURE — 85025 COMPLETE CBC W/AUTO DIFF WBC: CPT | Performed by: EMERGENCY MEDICINE

## 2025-01-20 PROCEDURE — 82948 REAGENT STRIP/BLOOD GLUCOSE: CPT

## 2025-01-20 PROCEDURE — 85730 THROMBOPLASTIN TIME PARTIAL: CPT | Performed by: EMERGENCY MEDICINE

## 2025-01-20 PROCEDURE — 99285 EMERGENCY DEPT VISIT HI MDM: CPT | Performed by: EMERGENCY MEDICINE

## 2025-01-20 PROCEDURE — 99285 EMERGENCY DEPT VISIT HI MDM: CPT

## 2025-01-20 PROCEDURE — 96360 HYDRATION IV INFUSION INIT: CPT

## 2025-01-20 PROCEDURE — 83735 ASSAY OF MAGNESIUM: CPT | Performed by: EMERGENCY MEDICINE

## 2025-01-20 RX ADMIN — IOHEXOL 100 ML: 350 INJECTION, SOLUTION INTRAVENOUS at 02:49

## 2025-01-20 RX ADMIN — SODIUM CHLORIDE 2000 ML: 0.9 INJECTION, SOLUTION INTRAVENOUS at 00:54

## 2025-01-20 NOTE — ED PROVIDER NOTES
Time reflects when diagnosis was documented in both MDM as applicable and the Disposition within this note       Time User Action Codes Description Comment    1/20/2025  3:49 AM Meño Hoover [R07.9] Chest pain, unspecified type     1/20/2025  3:49 AM Meño Hoover [R00.2] Palpitations           ED Disposition       ED Disposition   Discharge    Condition   Stable    Date/Time   Mon Jan 20, 2025  3:49 AM    Comment   Rasheed Rocha discharge to home/self care.                   Assessment & Plan       Medical Decision Making  Patient was seen and evaluated for his presentation as outlined above.  Differential diagnosis includes acute coronary syndrome, acute pulmonary embolism, hyperthyroidism, dehydration, medication effect, caffeine effect, infection, other.  Workup as shown.  EKG without acute abnormality, no ischemia or injury pattern.  No significant arrhythmia noted.  Did appear to have some premature, possibly ectopic beats on rhythm monitoring, although not captured on EKG.  Thyroid function normal.  Troponin normal.  No concerning electrolyte abnormalities or CBC abnormalities.  CT angiogram of the chest performed, shows no acute PE, incidental pulmonary cysts of doubtful clinical significance, otherwise no concerning findings.  Patient was given 2 L of IV fluids in the ED.  Did not have significant chest pain while in the ED.  Feels much better upon reassessment.  Suspect dehydration, possible overstimulation from caffeine and/or smoking.  Discussed test results and suspected causes with patient.  Expressed understanding.  Advised decreased caffeine intake and staying well-hydrated.  Will provide family practice and cardiology referral for follow-up, as patient did express concerns over family history of heart problems at a young age.  Patient is stable for discharge from the emergency department.  Strict return precautions reviewed.  All questions answered.    Amount and/or Complexity of Data  Reviewed  Labs: ordered.  Radiology: ordered.  ECG/medicine tests: ordered and independent interpretation performed.     Details: EKG by my interpretation demonstrates normal sinus rhythm at a ventricular rate of 75 bpm.  Normal axis, normal intervals.  No acute ST elevations or T wave inversions.  No prior EKGs available for comparison.    Risk  Prescription drug management.             Medications   sodium chloride 0.9 % bolus 2,000 mL (2,000 mL Intravenous New Bag 1/20/25 0054)   iohexol (OMNIPAQUE) 350 MG/ML injection (MULTI-DOSE) 100 mL (100 mL Intravenous Given 1/20/25 0249)       ED Risk Strat Scores   HEART Risk Score      Flowsheet Row Most Recent Value   Heart Score Risk Calculator    History 1 Filed at: 01/20/2025 0343   ECG 0 Filed at: 01/20/2025 0343   Age 0 Filed at: 01/20/2025 0343   Risk Factors 1 Filed at: 01/20/2025 0343   Troponin 0 Filed at: 01/20/2025 0343   HEART Score 2 Filed at: 01/20/2025 0343          HEART Risk Score      Flowsheet Row Most Recent Value   Heart Score Risk Calculator    History 1 Filed at: 01/20/2025 0343   ECG 0 Filed at: 01/20/2025 0343   Age 0 Filed at: 01/20/2025 0343   Risk Factors 1 Filed at: 01/20/2025 0343   Troponin 0 Filed at: 01/20/2025 0343   HEART Score 2 Filed at: 01/20/2025 0343                            SBIRT 22yo+      Flowsheet Row Most Recent Value   Initial Alcohol Screen: US AUDIT-C     1. How often do you have a drink containing alcohol? 0 Filed at: 01/20/2025 0040   2. How many drinks containing alcohol do you have on a typical day you are drinking?  0 Filed at: 01/20/2025 0040   3a. Male UNDER 65: How often do you have five or more drinks on one occasion? 0 Filed at: 01/20/2025 0040   Audit-C Score 0 Filed at: 01/20/2025 0040   CARLOS: How many times in the past year have you...    Used an illegal drug or used a prescription medication for non-medical reasons? Never Filed at: 01/20/2025 0040                            History of Present Illness        Chief Complaint   Patient presents with    Chest Pain     Intermittent squeezing Chest Pain x several hours. +lightheaded       Past Medical History:   Diagnosis Date    Hypoglycemia     No known problems       Past Surgical History:   Procedure Laterality Date    HERNIA REPAIR      AL VASECTOMY UNI/BI SPX W/POSTOP SEMEN EXAMS Bilateral 10/30/2023    Procedure: VASECTOMY;  Surgeon: Jaxon Thomason MD;  Location:  MAIN OR;  Service: Urology      Family History   Problem Relation Age of Onset    Heart attack Mother     Stroke Mother     Diabetes Father       Social History     Tobacco Use    Smoking status: Every Day     Current packs/day: 1.00     Types: Cigarettes    Smokeless tobacco: Never    Tobacco comments:     Has patches; Using nicotine pouches   Vaping Use    Vaping status: Never Used   Substance Use Topics    Alcohol use: Not Currently    Drug use: Not 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     Patient presents to the emergency department for evaluation of intermittent squeezing chest pain associated with feeling like his heart is pounding or beating hard.  States that he thinks the symptoms started Thursday.  He was traveling to [place] for work.  States that he initially had some low back pain that he thought was muscle pain that he attributed to dehydration and drinking several red bull energy drinks.  He tried to stop drinking the energy drinks and hydrate with water.  He noticed the intermittent chest discomfort and heart pounding.  It seemed to have transiently improved, but became worse throughout the day today.  Denies any associated shortness of breath.  Does have some occasional lightheadedness and dizziness as well.  Denies any cough or fever.  No prior similar  symptoms.  He is concerned because his mother had a heart attack at age 32 and family has extensive cardiac history.  Denies any known history of clotting disorders or DVTs/PEs.  Patient does smoke, just had a cigarette prior to coming into the ED.  Denies any illicit drug use.  He is on Suboxone.  No recent medication changes.  Denies any other significant past medical history.  No other complaints, modifying factors, or associated symptoms.        Review of Systems   All other systems reviewed and are negative.          Objective       ED Triage Vitals [01/20/25 0030]   Temperature Pulse Blood Pressure Respirations SpO2 Patient Position - Orthostatic VS   98.8 °F (37.1 °C) 85 (!) 197/93 17 100 % Lying      Temp Source Heart Rate Source BP Location FiO2 (%) Pain Score    Temporal Monitor Right arm -- 4      Vitals      Date and Time Temp Pulse SpO2 Resp BP Pain Score FACES Pain Rating User   01/20/25 0059 -- 68 98 % 16 121/74 -- -- SR   01/20/25 0030 98.8 °F (37.1 °C) 85 100 % 17 197/93 4 -- RG            Physical Exam  Vitals and nursing note reviewed.   Constitutional:       General: He is not in acute distress.     Appearance: He is well-developed. He is not ill-appearing.   HENT:      Head: Normocephalic and atraumatic.   Eyes:      Conjunctiva/sclera: Conjunctivae normal.   Cardiovascular:      Rate and Rhythm: Normal rate and regular rhythm.      Heart sounds: Normal heart sounds. No murmur heard.     No friction rub. No gallop.   Pulmonary:      Effort: Pulmonary effort is normal. No respiratory distress.      Breath sounds: Normal breath sounds. No wheezing, rhonchi or rales.   Abdominal:      Palpations: Abdomen is soft. There is no mass.      Tenderness: There is no abdominal tenderness. There is no guarding or rebound.   Musculoskeletal:         General: No swelling. Normal range of motion.      Cervical back: Normal range of motion and neck supple.   Skin:     General: Skin is warm and dry.       Capillary Refill: Capillary refill takes less than 2 seconds.   Neurological:      General: No focal deficit present.      Mental Status: He is alert and oriented to person, place, and time.   Psychiatric:         Mood and Affect: Mood normal.         Behavior: Behavior normal.         Results Reviewed       Procedure Component Value Units Date/Time    TSH, 3rd generation with Free T4 reflex [[id number]]  (Normal) Collected: 01/20/25 0054    Lab Status: Final result Specimen: Blood from Arm, Left Updated: 01/20/25 0141     TSH 3RD GENERATON 3.364 uIU/mL     HS Troponin 0hr (reflex protocol) [[id number]]  (Normal) Collected: 01/20/25 0054    Lab Status: Final result Specimen: Blood from Arm, Left Updated: 01/20/25 0140     hs TnI 0hr 3 ng/L     Comprehensive metabolic panel [[id number]]  (Abnormal) Collected: 01/20/25 0054    Lab Status: Final result Specimen: Blood from Arm, Left Updated: 01/20/25 0130     Sodium 137 mmol/L      Potassium 3.5 mmol/L      Chloride 99 mmol/L      CO2 33 mmol/L      ANION GAP 5 mmol/L      BUN 13 mg/dL      Creatinine 0.93 mg/dL      Glucose 123 mg/dL      Calcium 9.9 mg/dL      AST 31 U/L      ALT 23 U/L      Alkaline Phosphatase 55 U/L      Total Protein 7.7 g/dL      Albumin 4.7 g/dL      Total Bilirubin 0.60 mg/dL      eGFR 105 ml/min/1.73sq m     Narrative:      Specimen Lipemic.  [employer] guidelines for Chronic Kidney Disease (CKD):     Stage 1 with normal or high GFR (GFR > 90 mL/min/1.73 square meters)    Stage 2 Mild CKD (GFR = 60-89 mL/min/1.73 square meters)    Stage 3A Moderate CKD (GFR = 45-59 mL/min/1.73 square meters)    Stage 3B Moderate CKD (GFR = 30-44 mL/min/1.73 square meters)    Stage 4 Severe CKD (GFR = 15-29 mL/min/1.73 square meters)    Stage 5 End Stage CKD (GFR <15 mL/min/1.73 square meters)  Note: GFR calculation is accurate only with a steady state creatinine    Magnesium [[id number]]  (Normal) Collected: 01/20/25 0054    Lab Status:  Final result Specimen: Blood from Arm, Left Updated: 01/20/25 0130     Magnesium 2.1 mg/dL     Narrative:      Specimen Lipemic.    Protime-INR [[id number]]  (Normal) Collected: 01/20/25 0054    Lab Status: Final result Specimen: Blood from Arm, Left Updated: 01/20/25 0120     Protime 12.6 seconds      INR 0.91    Narrative:      INR Therapeutic Range    Indication                                             INR Range      Atrial Fibrillation                                               2.0-3.0  Hypercoagulable State                                    2.0.2.3  Left Ventricular Asist Device                            2.0-3.0  Mechanical Heart Valve                                  -    Aortic(with afib, MI, embolism, HF, LA enlargement,    and/or coagulopathy)                                     2.0-3.0 (2.5-3.5)     Mitral                                                             2.5-3.5  Prosthetic/Bioprosthetic Heart Valve               2.0-3.0  Venous thromboembolism (VTE: VT, PE        2.0-3.0    APTT [[id number]]  (Normal) Collected: 01/20/25 0054    Lab Status: Final result Specimen: Blood from Arm, Left Updated: 01/20/25 0120     PTT 30 seconds     CBC and differential [[id number]]  (Abnormal) Collected: 01/20/25 0054    Lab Status: Final result Specimen: Blood from Arm, Left Updated: 01/20/25 0107     WBC 11.34 Thousand/uL      RBC 5.47 Million/uL      Hemoglobin 16.0 g/dL      Hematocrit 45.9 %      MCV 84 fL      MCH 29.3 pg      MCHC 34.9 g/dL      RDW 12.4 %      MPV 9.3 fL      Platelets 227 Thousands/uL      nRBC 0 /100 WBCs      Segmented % 61 %      Immature Grans % 0 %      Lymphocytes % 31 %      Monocytes % 5 %      Eosinophils Relative 2 %      Basophils Relative 1 %      Absolute Neutrophils 6.99 Thousands/µL      Absolute Immature Grans 0.03 Thousand/uL      Absolute Lymphocytes 3.52 Thousands/µL      Absolute Monocytes 0.57 Thousand/µL      Eosinophils Absolute 0.17 Thousand/µL      Basophils  Absolute 0.06 Thousands/µL     Fingerstick Glucose (POCT) [[id number]]  (Normal) Collected: 01/20/25 0036    Lab Status: Final result Specimen: Blood Updated: 01/20/25 0037     POC Glucose 130 mg/dl             CTA chest pe study   Final Interpretation by Gama Burnette MD (01/20 0304)      No pulmonary embolus. No acute cardiopulmonary process is identified.                  Workstation performed: [id number]             Procedures    ED Medication and Procedure Management   Prior to Admission Medications   Prescriptions Last Dose Informant Patient Reported? Taking?   Multiple Vitamin (multivitamin) tablet   Yes No   Sig: Take 1 tablet by mouth daily   SUBOXONE 8-2 MG   Yes No   Sig: DISSOLVE 2 FILMS IN YOUR MOUTH DAILY      Facility-Administered Medications: None     Patient's Medications   Discharge Prescriptions    No medications on file       ED SEPSIS DOCUMENTATION   Time reflects when diagnosis was documented in both MDM as applicable and the Disposition within this note       Time User Action Codes Description Comment    1/20/2025  3:49 AM Meño Hoover [R07.9] Chest pain, unspecified type     1/20/2025  3:49 AM Meño Hoover [R00.2] Palpitations                  Meño Hoover MD  01/20/25 0351

## (undated) DEVICE — GLOVE SRG BIOGEL 7

## (undated) DEVICE — CHLORHEXIDINE 4PCT 4 OZ

## (undated) DEVICE — PREMIUM DRY TRAY LF: Brand: MEDLINE INDUSTRIES, INC.

## (undated) DEVICE — SUT ETHILON 4-0 PS-2 18 IN 1667H

## (undated) DEVICE — TUBING SUCTION 5MM X 12 FT

## (undated) DEVICE — MINOR PROCEDURE DRAPE: Brand: CONVERTORS

## (undated) DEVICE — ELECTRODE NEEDLE MOD E-Z CLEAN 2.75IN 7CM -0013M

## (undated) DEVICE — SCROTAL SUPPORT LGE

## (undated) DEVICE — INTENDED FOR TISSUE SEPARATION, AND OTHER PROCEDURES THAT REQUIRE A SHARP SURGICAL BLADE TO PUNCTURE OR CUT.: Brand: BARD-PARKER ® CARBON RIB-BACK BLADES

## (undated) DEVICE — INTENDED FOR TISSUE SEPARATION, AND OTHER PROCEDURES THAT REQUIRE A SHARP SURGICAL BLADE TO PUNCTURE OR CUT.: Brand: BARD-PARKER SAFETY BLADES SIZE 15, STERILE

## (undated) DEVICE — BETHLEHEM UNIVERSAL OUTPATIENT: Brand: CARDINAL HEALTH

## (undated) DEVICE — DISPOSABLE OR TOWEL: Brand: CARDINAL HEALTH

## (undated) DEVICE — DECANTER: Brand: UNBRANDED

## (undated) DEVICE — KERLIX BANDAGE ROLL: Brand: KERLIX

## (undated) DEVICE — NEPTUNE E-SEP SMOKE EVACUATION PENCIL, COATED, 70MM BLADE, PUSH BUTTON SWITCH: Brand: NEPTUNE E-SEP

## (undated) DEVICE — PAD GROUNDING ADULT

## (undated) DEVICE — SUT CHROMIC 4-0 RB-1 27 IN U203H